# Patient Record
Sex: FEMALE | Race: BLACK OR AFRICAN AMERICAN | NOT HISPANIC OR LATINO | URBAN - METROPOLITAN AREA
[De-identification: names, ages, dates, MRNs, and addresses within clinical notes are randomized per-mention and may not be internally consistent; named-entity substitution may affect disease eponyms.]

---

## 2022-06-21 ENCOUNTER — OFFICE (OUTPATIENT)
Dept: URBAN - METROPOLITAN AREA CLINIC 75 | Facility: CLINIC | Age: 87
End: 2022-06-21

## 2022-06-21 VITALS
HEIGHT: 61 IN | HEART RATE: 65 BPM | DIASTOLIC BLOOD PRESSURE: 70 MMHG | WEIGHT: 103 LBS | OXYGEN SATURATION: 88 % | SYSTOLIC BLOOD PRESSURE: 120 MMHG

## 2022-06-21 DIAGNOSIS — K30 FUNCTIONAL DYSPEPSIA: ICD-10-CM

## 2022-06-21 DIAGNOSIS — R13.11 DYSPHAGIA, ORAL PHASE: ICD-10-CM

## 2022-06-21 DIAGNOSIS — R63.0 ANOREXIA: ICD-10-CM

## 2022-06-21 DIAGNOSIS — R68.81 EARLY SATIETY: ICD-10-CM

## 2022-06-21 DIAGNOSIS — R63.4 ABNORMAL WEIGHT LOSS: ICD-10-CM

## 2022-06-21 DIAGNOSIS — R10.33 PERIUMBILICAL PAIN: ICD-10-CM

## 2022-06-21 DIAGNOSIS — K59.00 CONSTIPATION, UNSPECIFIED: ICD-10-CM

## 2022-06-21 PROCEDURE — 99204 OFFICE O/P NEW MOD 45 MIN: CPT | Performed by: INTERNAL MEDICINE

## 2022-06-21 NOTE — SERVICENOTES
records reviewed.  Calcium 10.4.  White count 3.26.  MCV 78.6.  Hepatic function panel unremarkable.  CBC otherwise negative.  Flat and upright shows improvement in constipation.  CT scan showed diverticulosis.  Motion artifact.  Limited because it was noncontrasted.

## 2022-06-21 NOTE — SERVICEHPINOTES
thank you very much for referring Ms. Oro for evaluation.  As you know she is a pleasant 86-year-old -American female with multiple medical problems including a history of diabetes, she's also hard of hearing and she does have dementia.  Most of the information was per chart review and from her daughter.  She does tell me her mom's been to the hospital at least 3 times due to impactions and constipation.  She's been using MiraLAX which does seem to help.  Her daughter couldn't tell me how many times a week she goes to the bathroom because she's not sure.  There's been no blood in the bowels.  CT scan was unremarkable but it was a poor quality study.  An abdominal film showed decreased constipation compared to previous studies.  She is on constipating medications.  She's been constipated for "a while".
br
br She's also had decreased appetite.  Her daughter says she's had gradual weight loss.  She is doing about 1:30, on our scale she's 103.  Since been ongoing for months.  Sometimes she has no appetite of the time she'll be hungry but have satiety.  She doesn't like Ensure doesn't drink much of it.  If she overeats it just causes abdominal discomfort so she doesn't want to eat.  She also has trouble swallowing at times but it seems to be more oropharyngeal, she does have a history of a prior CVA and her daughter says she's been evaluated by speech pathology.  The patient is in no acute distress.  Her calcium levels are elevated.  White count an MCV her lobe.  However she is not anemic.  She is in no acute distress.

## 2022-09-15 ENCOUNTER — OFFICE (OUTPATIENT)
Dept: URBAN - METROPOLITAN AREA CLINIC 75 | Facility: CLINIC | Age: 87
End: 2022-09-15

## 2022-09-15 VITALS
HEIGHT: 61 IN | HEART RATE: 70 BPM | WEIGHT: 101 LBS | DIASTOLIC BLOOD PRESSURE: 65 MMHG | SYSTOLIC BLOOD PRESSURE: 115 MMHG | OXYGEN SATURATION: 99 %

## 2022-09-15 DIAGNOSIS — R63.4 ABNORMAL WEIGHT LOSS: ICD-10-CM

## 2022-09-15 DIAGNOSIS — K59.00 CONSTIPATION, UNSPECIFIED: ICD-10-CM

## 2022-09-15 DIAGNOSIS — R63.0 ANOREXIA: ICD-10-CM

## 2022-09-15 PROCEDURE — 99214 OFFICE O/P EST MOD 30 MIN: CPT | Performed by: NURSE PRACTITIONER

## 2022-09-15 RX ORDER — LINACLOTIDE 72 UG/1
CAPSULE, GELATIN COATED ORAL
Qty: 30 | Refills: 5 | Status: COMPLETED
Start: 2022-09-15 | End: 2022-12-16

## 2022-12-16 ENCOUNTER — OFFICE (OUTPATIENT)
Dept: URBAN - METROPOLITAN AREA CLINIC 76 | Facility: CLINIC | Age: 87
End: 2022-12-16

## 2022-12-16 VITALS
SYSTOLIC BLOOD PRESSURE: 143 MMHG | WEIGHT: 103 LBS | HEART RATE: 79 BPM | OXYGEN SATURATION: 99 % | DIASTOLIC BLOOD PRESSURE: 84 MMHG | HEIGHT: 61 IN

## 2022-12-16 DIAGNOSIS — K21.9 GASTRO-ESOPHAGEAL REFLUX DISEASE WITHOUT ESOPHAGITIS: ICD-10-CM

## 2022-12-16 DIAGNOSIS — R63.0 ANOREXIA: ICD-10-CM

## 2022-12-16 DIAGNOSIS — K59.00 CONSTIPATION, UNSPECIFIED: ICD-10-CM

## 2022-12-16 PROCEDURE — 99214 OFFICE O/P EST MOD 30 MIN: CPT | Performed by: NURSE PRACTITIONER
